# Patient Record
Sex: MALE
[De-identification: names, ages, dates, MRNs, and addresses within clinical notes are randomized per-mention and may not be internally consistent; named-entity substitution may affect disease eponyms.]

---

## 2024-09-09 ENCOUNTER — APPOINTMENT (OUTPATIENT)
Dept: PODIATRY | Facility: CLINIC | Age: 24
End: 2024-09-09
Payer: COMMERCIAL

## 2024-09-09 DIAGNOSIS — Z87.891 PERSONAL HISTORY OF NICOTINE DEPENDENCE: ICD-10-CM

## 2024-09-09 DIAGNOSIS — M79.676 PAIN IN UNSPECIFIED TOE(S): ICD-10-CM

## 2024-09-09 DIAGNOSIS — L60.0 INGROWING NAIL: ICD-10-CM

## 2024-09-09 DIAGNOSIS — Z78.9 OTHER SPECIFIED HEALTH STATUS: ICD-10-CM

## 2024-09-09 PROBLEM — Z00.00 ENCOUNTER FOR PREVENTIVE HEALTH EXAMINATION: Status: ACTIVE | Noted: 2024-09-09

## 2024-09-09 PROCEDURE — 11765 WEDGE EXCISION SKN NAIL FOLD: CPT | Mod: 59,T5

## 2024-09-09 PROCEDURE — 99202 OFFICE O/P NEW SF 15 MIN: CPT | Mod: 25

## 2024-09-16 PROBLEM — M79.676 TOE PAIN: Status: ACTIVE | Noted: 2024-09-16

## 2024-09-16 PROBLEM — L60.0 INGROWN NAIL: Status: ACTIVE | Noted: 2024-09-16

## 2024-09-16 NOTE — PHYSICAL EXAM
[General Appearance - Alert] : alert [General Appearance - In No Acute Distress] : in no acute distress [General Appearance - Well Nourished] : well nourished [de-identified] : No pain on palpation of feet b/l, no pain on ROM of foot and ankle b/l, no structural deformities noted b/l [FreeTextEntry1] : Pain on palpation to ingrown toe nail border, right hallux nail lateral border, with edema, localized erythema not streaking, no purulence expressed, sanguineous drainage expressed- pain relief noted.

## 2024-09-16 NOTE — HISTORY OF PRESENT ILLNESS
[FreeTextEntry1] : Patient presents today complaining of a painful ingrown toenail. Painful ingrown toenail to right big toe has been present for a few days, and has been becoming more painful. Patient denies drainage from the area and denies constitutional symptoms. He states the pain gets worse when pressure is applied.

## 2024-09-16 NOTE — ASSESSMENT
[FreeTextEntry1] : Examination. Aseptic preparation of the hallux with betadine. Slant back excision of the affected ingrown nail border, right hallux nail lateral border, with sanguinous drainage expressed, performed with sterile instrumentation. Applied neosporin cream and sterile bandage to the hallux. Instructed patient to soak area in warm water for 10 min, prn, and apply antibiotic cream and bandaid to the area. Instructed patient to call our office if any signs or symptoms of infection arise. Patient demonstrated verbal understanding of all instructions. Patient to return to office prn.

## 2024-10-14 ENCOUNTER — APPOINTMENT (OUTPATIENT)
Dept: PODIATRY | Facility: CLINIC | Age: 24
End: 2024-10-14
Payer: COMMERCIAL

## 2024-10-14 DIAGNOSIS — Z87.891 PERSONAL HISTORY OF NICOTINE DEPENDENCE: ICD-10-CM

## 2024-10-14 DIAGNOSIS — Z78.9 OTHER SPECIFIED HEALTH STATUS: ICD-10-CM

## 2024-10-14 DIAGNOSIS — L03.031 CELLULITIS OF RIGHT TOE: ICD-10-CM

## 2024-10-14 PROCEDURE — 11765 WEDGE EXCISION SKN NAIL FOLD: CPT | Mod: T5

## 2024-10-14 RX ORDER — AMOXICILLIN AND CLAVULANATE POTASSIUM 875; 125 MG/1; MG/1
875-125 TABLET, COATED ORAL TWICE DAILY
Qty: 14 | Refills: 0 | Status: ACTIVE | COMMUNITY
Start: 2024-10-14 | End: 1900-01-01

## 2024-10-21 ENCOUNTER — APPOINTMENT (OUTPATIENT)
Dept: PODIATRY | Facility: CLINIC | Age: 24
End: 2024-10-21
Payer: COMMERCIAL

## 2024-10-21 DIAGNOSIS — L60.0 INGROWING NAIL: ICD-10-CM

## 2024-10-21 PROCEDURE — 99024 POSTOP FOLLOW-UP VISIT: CPT
